# Patient Record
Sex: FEMALE | Race: WHITE | Employment: FULL TIME | URBAN - METROPOLITAN AREA
[De-identification: names, ages, dates, MRNs, and addresses within clinical notes are randomized per-mention and may not be internally consistent; named-entity substitution may affect disease eponyms.]

---

## 2019-04-19 ENCOUNTER — HOSPITAL ENCOUNTER (OUTPATIENT)
Dept: MAMMOGRAPHY | Age: 39
Discharge: HOME | End: 2019-04-21
Payer: COMMERCIAL

## 2019-04-19 DIAGNOSIS — Z12.39 BREAST SCREENING: ICD-10-CM

## 2019-04-19 PROCEDURE — 77063 BREAST TOMOSYNTHESIS BI: CPT

## 2021-06-18 ENCOUNTER — HOSPITAL ENCOUNTER (OUTPATIENT)
Dept: MAMMOGRAPHY | Age: 41
Discharge: HOME | End: 2021-06-20
Payer: COMMERCIAL

## 2021-06-18 DIAGNOSIS — Z12.31 ENCOUNTER FOR SCREENING MAMMOGRAM FOR MALIGNANT NEOPLASM OF BREAST: ICD-10-CM

## 2021-06-18 PROCEDURE — 77063 BREAST TOMOSYNTHESIS BI: CPT

## 2024-06-27 ENCOUNTER — OFFICE VISIT (OUTPATIENT)
Age: 44
End: 2024-06-27
Payer: COMMERCIAL

## 2024-06-27 VITALS
DIASTOLIC BLOOD PRESSURE: 73 MMHG | SYSTOLIC BLOOD PRESSURE: 116 MMHG | WEIGHT: 151.9 LBS | HEART RATE: 81 BPM | HEIGHT: 66 IN | BODY MASS INDEX: 24.41 KG/M2

## 2024-06-27 DIAGNOSIS — Z12.4 CERVICAL CANCER SCREENING: Primary | ICD-10-CM

## 2024-06-27 DIAGNOSIS — Z12.31 BREAST CANCER SCREENING BY MAMMOGRAM: ICD-10-CM

## 2024-06-27 PROCEDURE — 99396 PREV VISIT EST AGE 40-64: CPT | Performed by: LEGAL MEDICINE

## 2024-06-27 RX ORDER — FLUCONAZOLE 150 MG/1
150 TABLET ORAL
Qty: 3 TABLET | Refills: 0 | Status: SHIPPED | OUTPATIENT
Start: 2024-06-27 | End: 2024-07-04

## 2024-06-27 RX ORDER — LISDEXAMFETAMINE DIMESYLATE 10 MG/1
10 CAPSULE ORAL DAILY
COMMUNITY
Start: 2024-05-22

## 2024-06-27 ASSESSMENT — ENCOUNTER SYMPTOMS
DIARRHEA: 0
RECTAL PAIN: 0
VOMITING: 0
NAUSEA: 0
CONSTIPATION: 0
ABDOMINAL DISTENTION: 0
ABDOMINAL PAIN: 0
BLOOD IN STOOL: 0

## 2024-06-27 NOTE — PROGRESS NOTES
This report has been created using voice recognition software.  It may contain errors which are inherent in voice recognition technology.    Mary Novoa     Patient presents for annual exam.  She complains of some occasional vaginal dryness with intercourse.  Cycle length is 1 month.  Normal flow with her menses.  No problems with her bowel or urinary function.    History reviewed. No pertinent past medical history.     Past Surgical History:   Procedure Laterality Date    ANKLE SURGERY  1995    Rt. ankle        Family History   Problem Relation Age of Onset    High Blood Pressure Father     High Cholesterol Father           Current Outpatient Medications:     VYVANSE 10 MG capsule, Take 1 capsule by mouth daily., Disp: , Rfl:     terconazole (TERAZOL 7) 0.4 % vaginal cream, Place vaginally nightly., Disp: 1 each, Rfl: 0    fluconazole (DIFLUCAN) 150 MG tablet, Take 1 tablet by mouth every 72 hours for 3 doses Check home pregnancy test before use., Disp: 3 tablet, Rfl: 0    prenatal vitamin (PRENATAL-S) 27-0.8 MG TABS, Take 1 tablet by mouth daily. (Patient not taking: Reported on 6/27/2024), Disp: , Rfl:      No Known Allergies     Social History       Tobacco History       Smoking Status  Every Day Current Packs/Day  0.5 packs/day Average Packs/Day  0.5 packs/day for 15.0 years (7.5 ttl pk-yrs) Smoking Tobacco Type  Cigarettes   Pack Year History     Packs/Day From To Years    0.5   15.0      Smokeless Tobacco Use  Unknown              Alcohol History       Alcohol Use Status  No              Drug Use       Drug Use Status  No              Sexual Activity       Sexually Active  Yes Partners  Male                     Review of Systems   Constitutional:  Negative for appetite change, chills and fever.   Gastrointestinal:  Negative for abdominal distention, abdominal pain, blood in stool, constipation, diarrhea, nausea, rectal pain and vomiting.   Endocrine: Negative for heat intolerance, polydipsia, polyphagia and

## 2024-07-02 LAB — GYNECOLOGY CYTOLOGY REPORT: NORMAL

## 2024-07-18 ENCOUNTER — HOSPITAL ENCOUNTER (OUTPATIENT)
Dept: MAMMOGRAPHY | Age: 44
Discharge: HOME | End: 2024-07-20
Attending: LEGAL MEDICINE
Payer: COMMERCIAL

## 2024-07-18 DIAGNOSIS — Z12.31 BREAST CANCER SCREENING BY MAMMOGRAM: ICD-10-CM

## 2024-07-18 PROCEDURE — 77063 BREAST TOMOSYNTHESIS BI: CPT

## 2024-07-21 DIAGNOSIS — R92.30 DENSE BREASTS: Primary | ICD-10-CM

## 2024-07-23 ENCOUNTER — TELEPHONE (OUTPATIENT)
Age: 44
End: 2024-07-23

## 2024-07-23 NOTE — TELEPHONE ENCOUNTER
----- Message from Sharon Ravi MD sent at 7/21/2024  3:37 PM EDT -----  Please call patient.7/21/24    Mammogram demonstrates dense breasts.  This lowers the sensitivity of the mammogram for finding potential malignancies.  Breast ultrasound is recommended to improve the accuracy of the mammogram.  An order for breast ultrasound has been placed for her.  Ask her to check with her insurance company to make sure that the study is covered.    Thank you

## 2024-07-23 NOTE — TELEPHONE ENCOUNTER
Detailed VM left for pt with results. Pt instructed to check with ins if she would like US study.